# Patient Record
Sex: FEMALE | Race: WHITE | URBAN - METROPOLITAN AREA
[De-identification: names, ages, dates, MRNs, and addresses within clinical notes are randomized per-mention and may not be internally consistent; named-entity substitution may affect disease eponyms.]

---

## 2018-02-26 ENCOUNTER — OFFICE VISIT (OUTPATIENT)
Dept: OBGYN CLINIC | Age: 23
End: 2018-02-26

## 2018-02-26 ENCOUNTER — HOSPITAL ENCOUNTER (OUTPATIENT)
Dept: LAB | Age: 23
Discharge: HOME | End: 2018-02-26
Payer: COMMERCIAL

## 2018-02-26 VITALS
BODY MASS INDEX: 49.41 KG/M2 | SYSTOLIC BLOOD PRESSURE: 111 MMHG | DIASTOLIC BLOOD PRESSURE: 82 MMHG | HEIGHT: 64 IN | HEART RATE: 76 BPM | WEIGHT: 289.4 LBS

## 2018-02-26 DIAGNOSIS — Z12.4 SCREENING FOR MALIGNANT NEOPLASM OF CERVIX: ICD-10-CM

## 2018-02-26 DIAGNOSIS — E66.01 MORBID OBESITY WITH BMI OF 45.0-49.9, ADULT (HCC): ICD-10-CM

## 2018-02-26 DIAGNOSIS — Z00.00 WELL WOMAN EXAM (NO GYNECOLOGICAL EXAM): Primary | ICD-10-CM

## 2018-02-26 PROCEDURE — 87491 CHLMYD TRACH DNA AMP PROBE: CPT | Performed by: OBSTETRICS & GYNECOLOGY

## 2018-02-26 PROCEDURE — 88175 CYTOPATH C/V AUTO FLUID REDO: CPT | Performed by: OBSTETRICS & GYNECOLOGY

## 2018-02-26 NOTE — MR AVS SNAPSHOT
[address] [place] [address] [id number] [id number] 
[phone] Patient: Anastacio Mondragon MRN: [id number] :[id number] Visit Information Date & Time Provider Department Dept. Phone Encounter #  
 2018  2:15 PM Emiliano Novoa, [facility] [phone] [id number] Upcoming Health Maintenance Date Due  
 HPV AGE 9Y-34Y (1 of 3 - Female 3 Dose Series) 10/1/2005 PAP AKA CERVICAL CYTOLOGY 10/1/2015 Influenza Age 5 to Adult 2017 Allergies as of 2018  Review Complete On: 2018 By: Emiliano Novoa MD  
  
 Severity Noted Reaction Type Reactions Coconut Oil  2018    Swelling Itchy tongue Pcn [Penicillins]  2018    Rash  
 Sulfa (Sulfonamide Antibiotics)  2018    Rash Current Immunizations  Never Reviewed No immunizations on file. Not reviewed this visit You Were Diagnosed With   
  
 Codes Comments Well woman exam (no gynecological exam)    -  Primary ICD-10-CM: Z00.00 ICD-9-CM: V70.0 [V70.0] Morbid obesity with BMI of 45.0-49.9, adult (HCC)     ICD-10-CM: E66.01, Z68.42 
ICD-9-CM: 278.01, V85.42 Screening for malignant neoplasm of cervix     ICD-10-CM: Z12.4 ICD-9-CM: V76.2 Vitals BP Pulse Height(growth percentile) Weight(growth percentile) LMP BMI  
 111/82 76 5' 4\" (1.626 m) 289 lb 6.4 oz (131.3 kg) 12/15/2017 (Approximate) 49.68 kg/m2 OB Status Smoking Status IUD Never Smoker BMI and BSA Data Body Mass Index Body Surface Area  
 49.68 kg/m 2 2.43 m 2 Your Updated Medication List  
  
Notice  As of 2018  3:07 PM  
 You have not been prescribed any medications. Introducing [facility]! Dear Benny Jiang: 
Thank you for requesting a [employer] account. Our records indicate that you already have an active [employer] account. You can access your account anytime at [email or web address] Did you know that you can access your hospital and ER discharge instructions at any time in [employer]? You can also review all of your test results from your hospital stay or ER visit. Additional Information If you have questions, please visit the Frequently Asked Questions section of the [employer] website at [email or web address] Remember, [employer] is NOT to be used for urgent needs. For medical emergencies, dial 911. Now available from your iPhone and Android! Please provide this summary of care documentation to your next provider. If you have any questions after today's visit, please call [phone].

## 2018-02-26 NOTE — PROGRESS NOTES
Subjective:   20 y.o. female for Well Woman Check. Patient's last menstrual period was 12/15/2017 (approximate). Social History: single partner, contraception - IUD. Philip Pop Patient Active Problem List    Diagnosis Date Noted    Obesity, morbid ([facility] 02/26/2018       Allergies   Allergen Reactions    Coconut Oil Swelling     Itchy tongue    Pcn [Penicillins] Rash    Sulfa (Sulfonamide Antibiotics) Rash     Past Medical History:   Diagnosis Date    Protein S deficiency ([facility] [address]      History reviewed. No pertinent surgical history. Family History   Problem Relation Age of Onset    Hypertension Mother     Diabetes Father      Social History   Substance Use Topics    Smoking status: Never Smoker    Smokeless tobacco: Never Used    Alcohol use Yes      Comment: socially        ROS:  Feeling well. No dyspnea or chest pain on exertion. No nausea/vomiting. No diarrhea or constipation. No abdominal pain, change in bowel habits. No urinary tract symptoms. No change in skin or hair. GYN ROS: no breast pain or new or enlarging lumps on self exam, she complains of 1-2 days of spotting every month, occ pelvic cramping. No neurological complaints. Objective:     Visit Vitals    /82    Pulse 76    Ht 5' 4\" (1.626 m)    Wt 289 lb 6.4 oz (131.3 kg)    LMP 12/15/2017 (Approximate)    BMI 49.68 kg/m2     The patient appears well, alert, oriented x 3, in no distress. Neck supple. No adenopathy or thyromegaly. Lungs are clear, good air entry, no wheezes, rhonchi or rales. S1 and S2 normal, no murmurs, regular rate and rhythm. Abdomen obese, soft without tenderness, guarding, mass or organomegaly. Neurological is normal, no focal findings. Normal mood and affect.     BREAST EXAM: breasts appear normal, no suspicious masses, no skin or nipple changes or axillary nodes    PELVIC EXAM: VULVA: normal appearing vulva with no masses, tenderness or lesions, VAGINA: normal appearing vagina with normal color and discharge, no lesions, CERVIX: normal appearing cervix without discharge or lesions, cervical motion tenderness absent, iud strings palpated at cervical os; UTERUS: uterus is normal size, shape, consistency and nontender, ADNEXA: normal adnexa in size, nontender and no masses, exam limited by body habitus, PAP: Pap smear done today    Assessment/Plan:   well woman  pap smear  counseled on healthy weight, regular exercise and healthy diet choices  return annually or prn    ICD-10-CM ICD-9-CM    1. Well woman exam (no gynecological exam) Z00.00 V70.0 PAP IG, CT-NG, [address] HPV QEDL(137434, [id number])    [V70.0]   2. Morbid obesity with BMI of 45.0-49.9, adult ([facility] E66.01 278.01     Z68.42 V85.42    3. Screening for malignant neoplasm of cervix Z12.4 V76.2    .

## 2018-02-27 LAB
C TRACH RRNA SPEC QL NAA+PROBE: NEGATIVE
N GONORRHOEA RRNA SPEC QL NAA+PROBE: NEGATIVE
SPECIMEN SOURCE: NORMAL

## 2018-03-04 DIAGNOSIS — B96.89 BV (BACTERIAL VAGINOSIS): Primary | ICD-10-CM

## 2018-03-04 DIAGNOSIS — N76.0 BV (BACTERIAL VAGINOSIS): Primary | ICD-10-CM

## 2018-03-04 RX ORDER — METRONIDAZOLE 500 MG/1
500 TABLET ORAL 2 TIMES DAILY
Qty: 14 TAB | Refills: 0 | Status: SHIPPED | OUTPATIENT
Start: 2018-03-04 | End: 2018-03-11